# Patient Record
Sex: FEMALE | Race: WHITE | Employment: FULL TIME | URBAN - METROPOLITAN AREA
[De-identification: names, ages, dates, MRNs, and addresses within clinical notes are randomized per-mention and may not be internally consistent; named-entity substitution may affect disease eponyms.]

---

## 2021-11-23 ENCOUNTER — OFFICE VISIT (OUTPATIENT)
Dept: NEUROLOGY | Age: 51
End: 2021-11-23
Payer: COMMERCIAL

## 2021-11-23 VITALS
BODY MASS INDEX: 27.49 KG/M2 | WEIGHT: 161 LBS | TEMPERATURE: 97.9 F | RESPIRATION RATE: 16 BRPM | HEART RATE: 71 BPM | OXYGEN SATURATION: 96 % | HEIGHT: 64 IN | SYSTOLIC BLOOD PRESSURE: 102 MMHG | DIASTOLIC BLOOD PRESSURE: 63 MMHG

## 2021-11-23 DIAGNOSIS — R20.0 NUMBNESS AND TINGLING IN LEFT ARM: ICD-10-CM

## 2021-11-23 DIAGNOSIS — R20.2 NUMBNESS AND TINGLING OF RIGHT ARM: ICD-10-CM

## 2021-11-23 DIAGNOSIS — G44.89 OTHER HEADACHE SYNDROME: ICD-10-CM

## 2021-11-23 DIAGNOSIS — R20.2 NUMBNESS AND TINGLING IN LEFT ARM: ICD-10-CM

## 2021-11-23 DIAGNOSIS — R20.0 NUMBNESS AND TINGLING OF RIGHT ARM: ICD-10-CM

## 2021-11-23 DIAGNOSIS — R42 DIZZY: Primary | ICD-10-CM

## 2021-11-23 PROCEDURE — 99244 OFF/OP CNSLTJ NEW/EST MOD 40: CPT | Performed by: PSYCHIATRY & NEUROLOGY

## 2021-11-23 RX ORDER — MODAFINIL 200 MG/1
TABLET ORAL
COMMUNITY
Start: 2021-09-28

## 2021-11-23 RX ORDER — GABAPENTIN 300 MG/1
300 CAPSULE ORAL
COMMUNITY

## 2021-11-23 RX ORDER — METFORMIN HYDROCHLORIDE 1000 MG/1
TABLET ORAL
COMMUNITY
Start: 2021-10-08

## 2021-11-23 RX ORDER — INSULIN GLARGINE 300 U/ML
30 INJECTION, SOLUTION SUBCUTANEOUS DAILY
COMMUNITY
Start: 2021-10-13

## 2021-11-23 RX ORDER — LANSOPRAZOLE 30 MG/1
CAPSULE, DELAYED RELEASE ORAL
COMMUNITY
Start: 2021-09-16

## 2021-11-23 RX ORDER — ROSUVASTATIN CALCIUM 5 MG/1
TABLET, COATED ORAL
COMMUNITY
Start: 2021-11-15

## 2021-11-23 RX ORDER — MONTELUKAST SODIUM 10 MG/1
TABLET ORAL
COMMUNITY
Start: 2021-10-21

## 2021-11-23 RX ORDER — HYDROCHLOROTHIAZIDE 25 MG/1
1 TABLET ORAL DAILY
COMMUNITY
Start: 2021-10-22

## 2021-11-23 RX ORDER — FLASH GLUCOSE SENSOR
KIT MISCELLANEOUS
COMMUNITY
Start: 2021-11-15

## 2021-11-23 RX ORDER — NEBIVOLOL 10 MG/1
TABLET ORAL
COMMUNITY
Start: 2021-10-21

## 2021-11-23 RX ORDER — LISINOPRIL 40 MG/1
TABLET ORAL
COMMUNITY

## 2021-11-23 RX ORDER — OMEPRAZOLE 20 MG/1
TABLET, DELAYED RELEASE ORAL
COMMUNITY

## 2021-11-23 NOTE — PROGRESS NOTES
[facility] and [address] at [facility] Neurology Clinics at [address] [facility] [address] [place], [address] [facility] [address] [facility], [facility] [address] [facility]  [phone] Office  [phone] Facsimile           Referring: Jama Khoury MD  [facility],  [address] [facility]    Chief Complaint   Patient presents with    New Patient    Numbness     both arms for the past yr but has progressed from every so often to frequent. will sometimes occur in legs but not as severe     51-year-old lady presents today for neurologic consultation regarding numbness in her upper extremities as well as headache and dizziness. She notes her symptoms have been ongoing for perhaps about 2 years but increasing in intensity over the last few months. She notes that she keeps a constant headache. She is having numbness in her bilateral upper extremities and has recently started to have what she calls dizziness. She notes that in terms of the numbness she is awakening with numbness in her bilateral upper extremities. She notes from the shoulders down to her fingers left greater than right are numb. All fingers are involved. She has tingling. Now is starting to have pain. No radicular pain. No neck pain. Just the upper extremities and all fingers are numb. She started to get weak in the hands. She is dropping things. Finding it difficult to open bottles or cans. She also gets the same sensation when she is driving holding the steering wheel. If she holds her phone for more than a minute she also have the same sensation. She is having headache this holoacranial.  No associated symptoms. Is constant. It never goes away. Dizziness is described as a feeling of imbalance but not lightheadedness. No spinning. Constant. No loss or alteration in consciousness. No hemibody weakness. No inciting factor. No imaging.   No changes in medicine. No fever. No chills. Past Medical History:   Diagnosis Date    Diabetes type 2, uncontrolled ([address]     High cholesterol     HTN (hypertension)        Past Surgical History:   Procedure Laterality Date    HX DILATION AND CURETTAGE  2007    HX HYSTERECTOMY  2012       Current Outpatient Medications   Medication Sig Dispense Refill    FreeStyle Carrie 14 Day Sensor kit USE AS DIRECTED TO TEST BLOOD SUGAR      hydroCHLOROthiazide (HYDRODIURIL) 25 mg tablet Take 1 Tablet by mouth daily.  Toujeo SoloStar U-300 Insulin 300 unit/mL (1.5 mL) inpn pen 30 Units by SubCUTAneous route daily.  lansoprazole (PREVACID) 30 mg capsule TAKE 1 CAPSULE BY MOUTH DAILY FOR ACID REFLUX      lisinopriL (PRINIVIL, ZESTRIL) 40 mg tablet lisinopril 40 mg tablet   Take 1 tablet every day by oral route.  metFORMIN (GLUCOPHAGE) 1,000 mg tablet TAKE 1 TABLET BY MOUTH TWICE A DAY (WITH FOOD) WITH A MEAL      modafiniL (PROVIGIL) 200 mg tablet TAKE 1 TABLET IN THE MORNING AND 1/2 TABLET IN THE AFTERNOON DAILY.  montelukast (SINGULAIR) 10 mg tablet TAKE 1 TABLET BY MOUTH AT NIGHT FOR ALLERGIES.  nebivoloL (BYSTOLIC) 10 mg tablet TAKE 1 TABLET BY MOUTH ONCE A DAY AT NIGHT FOR HEART RATE AND BLOOD PRESSURE.  omeprazole (PriLOSEC OTC) 20 mg tablet Prilosec OTC 20 mg tablet,delayed release   Take 1 tablet every day by oral route.  rosuvastatin (CRESTOR) 5 mg tablet TAKE ONE TABLET BY MOUTH EVERY DAY AFTER SUPPER FOR CHOLESTEROL FOR 30 DAYS      gabapentin (NEURONTIN) 300 mg capsule Take 300 mg by mouth nightly. No Known Allergies    Social History     Tobacco Use    Smoking status: Never Smoker    Smokeless tobacco: Never Used   Vaping Use    Vaping Use: Never used   Substance Use Topics    Alcohol use:  Yes     Alcohol/week: 1.0 standard drink     Types: 1 Cans of beer per week    Drug use: Not Currently       Family History   Problem Relation Age of Onset    Diabetes Mother  Headache Mother     Hypertension Mother     Other Father         aneurysm    Lung Cancer Father     Diabetes Father     Hypertension Father        Review of Systems  Pertinent positives and negatives as noted. Examination  Visit Vitals  /63 (BP 1 Location: Right arm, BP Patient Position: Sitting, BP Cuff Size: Adult)   Pulse 71   Temp 97.9 °F (36.6 °C)   Resp 16   Ht 5' 4\" (1.626 m)   Wt 73 kg (161 lb)   SpO2 96%   BMI 27.64 kg/m²     Neurologically, she is awake, alert, and oriented with normal speech and language. Her cognition is normal.    Intact cranial nerves 2-12. No nystagmus. Disk margins are flat bilaterally. She has normal bulk and tone. She has no abnormal movement. She has no pronation or drift. She generates full strength in the upper and lower extremities to direct confrontational testing. Reflexes are symmetrical in the upper and lower extremities bilaterally. No pathologic reflexes are elicited. Finger nose finger and rapid alternating movements are normal.  Steady gait. No Lhermitte sign    Impression/Plan  61-year-old lady with progressive symptoms headache extremity numbness/tingling as well as dizziness and differential diagnosis certainly includes demyelinating lesion versus space-occupying lesion versus vascular lesion versus other  MRI of the brain and if unremarkable we will proceed with MRI of the cervical spine  EMG of the upper extremities  Carotid Doppler  Follow-up after testing    Pearson Peabody, MD          This note was created using voice recognition software. Despite editing, there may be syntax errors. no

## 2021-11-23 NOTE — PROGRESS NOTES
Room 7    Chief Complaint   Patient presents with    New Patient    Numbness     both arms for the past yr but has progressed from every so often to frequent.   will sometimes occur in legs but not as severe

## 2021-12-13 ENCOUNTER — HOSPITAL ENCOUNTER (OUTPATIENT)
Dept: MRI IMAGING | Age: 51
Discharge: HOME | End: 2021-12-13
Payer: COMMERCIAL

## 2021-12-13 DIAGNOSIS — R20.2 NUMBNESS AND TINGLING OF RIGHT ARM: ICD-10-CM

## 2021-12-13 DIAGNOSIS — R42 DIZZY: ICD-10-CM

## 2021-12-13 DIAGNOSIS — R20.0 NUMBNESS AND TINGLING IN LEFT ARM: ICD-10-CM

## 2021-12-13 DIAGNOSIS — R20.0 NUMBNESS AND TINGLING OF RIGHT ARM: ICD-10-CM

## 2021-12-13 DIAGNOSIS — G44.89 OTHER HEADACHE SYNDROME: ICD-10-CM

## 2021-12-13 DIAGNOSIS — R20.2 NUMBNESS AND TINGLING IN LEFT ARM: ICD-10-CM

## 2021-12-13 LAB — CREAT BLD-MCNC: 0.9 MG/DL (ref 0.6–1.3)

## 2021-12-13 PROCEDURE — 70553 MRI BRAIN STEM W/O & W/DYE: CPT

## 2021-12-13 PROCEDURE — 82565 ASSAY OF CREATININE: CPT

## 2021-12-13 PROCEDURE — A9577 INJ MULTIHANCE: HCPCS | Performed by: PSYCHIATRY & NEUROLOGY

## 2021-12-13 PROCEDURE — 74011250636 HC RX REV CODE- 250/636: Performed by: PSYCHIATRY & NEUROLOGY

## 2021-12-13 RX ADMIN — GADOBENATE DIMEGLUMINE 15 ML: 529 INJECTION, SOLUTION INTRAVENOUS at 09:44

## 2022-01-05 ENCOUNTER — OFFICE VISIT (OUTPATIENT)
Dept: NEUROLOGY | Age: 52
End: 2022-01-05

## 2022-01-05 DIAGNOSIS — R20.2 NUMBNESS AND TINGLING OF RIGHT ARM: ICD-10-CM

## 2022-01-05 DIAGNOSIS — R42 DIZZY: Primary | ICD-10-CM

## 2022-01-05 DIAGNOSIS — G56.02 CARPAL TUNNEL SYNDROME OF LEFT WRIST: Primary | ICD-10-CM

## 2022-01-05 DIAGNOSIS — R20.0 NUMBNESS AND TINGLING OF RIGHT ARM: ICD-10-CM

## 2022-01-05 PROCEDURE — 95911 NRV CNDJ TEST 9-10 STUDIES: CPT | Performed by: PSYCHIATRY & NEUROLOGY

## 2022-01-05 PROCEDURE — 95886 MUSC TEST DONE W/N TEST COMP: CPT | Performed by: PSYCHIATRY & NEUROLOGY

## 2022-01-05 NOTE — PROCEDURES
EMG/ NCS Report  [facility] - INPATIENT  [address] [facility], [address]   Ph: [phone]   FAX: [phone]/ [phone]    Test Date:  2022    Patient: Lakshmi Machado : 1970 Physician: Raul Girard MD   ID#: [id number] SEX: Female Ref. Phys: Miley Ashley M.D. Patient History / Exam:  Patient complaining of numbness and tingling of the upper extremity with weakness. Exam is non-focal. Assess for neuropathy vs cervical radiculopathy. EMG & NCV Findings:  Sensory and motor nerve conductions studies (as indicated in the tables) were within reference of normal. There is however a significant latency difference between the left median and ulnar sensory responses. Disposable concentric needle EMG (as indicated in the table) showed no evidence of electrical instability. Impressions: This study is mildly abnormal.  There is electrodiagnostic evidence of.a very early left distal median sensory neuropathy at the wrist as seen in carpal tunnel syndrome. There is no evidence of a generalized neuropathy, myopathy or significant cervical radiculopathy at this time. Recommend night time wrist splint. Thank you for the consult.     Raul Girard MD    Nerve Conduction Studies  Anti Sensory Summary Table     Stim Site NR Peak (ms) Norm Peak (ms) P-T Amp (µV) Norm P-T Amp Site1 Site2 Dist (cm)   Left Median Anti Sensory (2nd Digit)  32.1°C   Wrist    3.9 <4 19.9 >13 Wrist 2nd Digit 14.0   Right Median Anti Sensory (2nd Digit)  32.2°C   Wrist    3.4 <4 29.1 >13 Wrist 2nd Digit 14.0   Left Radial Anti Sensory (Base 1st Digit)  32.6°C   Wrist    1.9 <2.8 36.8 >11 Wrist Base 1st Digit 10.0   Right Radial Anti Sensory (Base 1st Digit)  32.3°C   Wrist    1.9 <2.8 35.9 >11 Wrist Base 1st Digit 10.0   Left Ulnar Anti Sensory (5th Digit)  32.5°C   Wrist    3.0 <4.0 32.3 >9 Wrist 5th Digit 14.0   Right Ulnar Anti Sensory (5th Digit)  32.3°C   Wrist 3. 1 <4.0 33.1 >9 Wrist 5th Digit 14.0     Motor Summary Table     Stim Site NR Onset (ms) Norm Onset (ms) O-P Amp (mV) Norm O-P Amp Amp (Prev) (%) Site1 Site2 Dist (cm) Bryson (m/s) Norm Bryson (m/s)   Left Median Motor (Abd Poll Brev)  32.5°C   Wrist    4.4 <4.5 7.3 >4.1 100.0 Wrist Abd Poll Brev 8.0     Elbow    8.0  6.0  82.2 Elbow Wrist 18.5 51 >49   Right Median Motor (Abd Poll Brev)  32.3°C   Wrist    3.9 <4.5 7.9 >4.1 100.0 Wrist Abd Poll Brev 8.0     Elbow    7.5  7.7  97.5 Elbow Wrist 19.5 54 >49   Left Ulnar Motor (Abd Dig Minimi)  32.5°C   Wrist    2.3 <3.1 9.2 >7.0 100.0 Wrist Abd Dig Minimi 8.0  >50   B Elbow    5.6  7.9  85.9 B Elbow Wrist 19.0 58 >50   Right Ulnar Motor (Abd Dig Minimi)  32.2°C   Wrist    2.3 <3.1 7.6 >7.0 100.0 Wrist Abd Dig Minimi 8.0  >50   B Elbow    5.4  7.9  103.9 B Elbow Wrist 18.0 58 >50   A Elbow    6.9  8.0  101.3 A Elbow B Elbow 10.0 67 >50       EMG     Side Muscle Nerve Root Ins Act Fibs Psw Recrt Duration Amp Poly Comment   Left Deltoid Axillary C5-6 Nml Nml Nml Nml Nml Nml Nml    Left Triceps Radial C6-7-8 Nml Nml Nml Nml Nml Nml Nml    Left Biceps Musculocut C5-6 Nml Nml Nml Nml Nml Nml Nml    Left 1stDorInt Ulnar C8-T1 Nml Nml Nml Nml Nml Nml Nml    Left Abd Poll Brev Median C8-T1 Nml Nml Nml Nml Nml Nml Nml    Left Upper Cerv Parasp Rami C3,4 Nml Nml Nml Nml Nml Nml Nml    Left Mid Cerv Parasp Rami C5,6 Nml Nml Nml Nml Nml Nml Nml    Left Lower Cerv Parasp Rami C7,T1 Nml Nml Nml Nml Nml Nml Nml    Right Deltoid Axillary C5-6 Nml Nml Nml Nml Nml Nml Nml    Right Triceps Radial C6-7-8 Nml Nml Nml Nml Nml Nml Nml    Right Biceps Musculocut C5-6 Nml Nml Nml Nml Nml Nml Nml    Right PronatorTeres Median C6-7 Nml Nml Nml Nml Nml Nml Nml    Right 1stDorInt Ulnar C8-T1 Nml Nml Nml Nml Nml Nml Nml    Right Lower Cerv Parasp Rami C7,T1 Nml Nml Nml Nml Nml Nml Nml    Right Mid Cerv Parasp Rami C5,6 Nml Nml Nml Nml Nml Nml Nml    Right Upper Cerv Parasp Rami C3,4 Nml Nml Nml Nml Nml Nml Nml      Waveforms:

## 2022-01-06 NOTE — PROCEDURES
EEG:      Date:  01/05/22    Requesting Physician:  Soledad Pimentel MD     An EEG is requested in this 53-year-old lady to evaluate for epileptiform abnormality. Medications:  Medications are said to include Neurontin, Crestor, Prilosec, Bystolic, Singulair, Metformin, Lisinopril, Prevacid. This tracing is obtained during the awake state. During wakefulness there are intermittent runs of posteriorly dominant and symmetric low to medium amplitude 10 cycle per second activities, which attenuate with eye opening. Lower voltage, faster frequency activities are seen symmetrically over the anterior head regions. Hyperventilation is not performed. Intermittent photic stimulation induces symmetric posterior driving responses. Sleep is not attained. Interpretation:   This EEG recorded during the awake state is normal.

## 2022-02-03 ENCOUNTER — OFFICE VISIT (OUTPATIENT)
Dept: NEUROLOGY | Age: 52
End: 2022-02-03
Payer: COMMERCIAL

## 2022-02-03 VITALS
DIASTOLIC BLOOD PRESSURE: 84 MMHG | SYSTOLIC BLOOD PRESSURE: 118 MMHG | OXYGEN SATURATION: 97 % | RESPIRATION RATE: 19 BRPM | HEART RATE: 76 BPM

## 2022-02-03 DIAGNOSIS — R20.0 NUMBNESS AND TINGLING OF RIGHT ARM: Primary | ICD-10-CM

## 2022-02-03 DIAGNOSIS — G56.02 CARPAL TUNNEL SYNDROME OF LEFT WRIST: ICD-10-CM

## 2022-02-03 DIAGNOSIS — R20.2 NUMBNESS AND TINGLING OF RIGHT ARM: Primary | ICD-10-CM

## 2022-02-03 DIAGNOSIS — R20.2 NUMBNESS AND TINGLING IN LEFT ARM: ICD-10-CM

## 2022-02-03 DIAGNOSIS — R20.0 NUMBNESS AND TINGLING IN LEFT ARM: ICD-10-CM

## 2022-02-03 PROCEDURE — 99214 OFFICE O/P EST MOD 30 MIN: CPT | Performed by: PSYCHIATRY & NEUROLOGY

## 2022-02-03 NOTE — PROGRESS NOTES
[address] [facility] Neurology Clinics and [address] at [facility] Neurology Clinics at [address] [facility], [address] [place] [address] [address] [facility], [address] [place] [address]   [phone]              Chief Complaint   Patient presents with    Results     Current Outpatient Medications   Medication Sig Dispense Refill    FreeStyle Carrie 14 Day Sensor kit USE AS DIRECTED TO TEST BLOOD SUGAR      hydroCHLOROthiazide (HYDRODIURIL) 25 mg tablet Take 1 Tablet by mouth daily.  Toujeo SoloStar U-300 Insulin 300 unit/mL (1.5 mL) inpn pen 30 Units by SubCUTAneous route daily.  lansoprazole (PREVACID) 30 mg capsule TAKE 1 CAPSULE BY MOUTH DAILY FOR ACID REFLUX      lisinopriL (PRINIVIL, ZESTRIL) 40 mg tablet lisinopril 40 mg tablet   Take 1 tablet every day by oral route.  metFORMIN (GLUCOPHAGE) 1,000 mg tablet TAKE 1 TABLET BY MOUTH TWICE A DAY (WITH FOOD) WITH A MEAL      montelukast (SINGULAIR) 10 mg tablet TAKE 1 TABLET BY MOUTH AT NIGHT FOR ALLERGIES.  nebivoloL (BYSTOLIC) 10 mg tablet TAKE 1 TABLET BY MOUTH ONCE A DAY AT NIGHT FOR HEART RATE AND BLOOD PRESSURE.  omeprazole (PriLOSEC OTC) 20 mg tablet Prilosec OTC 20 mg tablet,delayed release   Take 1 tablet every day by oral route.  rosuvastatin (CRESTOR) 5 mg tablet TAKE ONE TABLET BY MOUTH EVERY DAY AFTER SUPPER FOR CHOLESTEROL FOR 30 DAYS      gabapentin (NEURONTIN) 300 mg capsule Take 300 mg by mouth nightly.  modafiniL (PROVIGIL) 200 mg tablet TAKE 1 TABLET IN THE MORNING AND 1/2 TABLET IN THE AFTERNOON DAILY. (Patient not taking: Reported on 2/3/2022)        No Known Allergies  Social History     Tobacco Use    Smoking status: Never Smoker    Smokeless tobacco: Never Used   Vaping Use    Vaping Use: Never used   Substance Use Topics    Alcohol use:  Yes     Alcohol/week: 1.0 standard drink     Types: 1 Cans of beer per week    Drug use: Not Currently 53-year-old lady returns today for follow-up. I saw her as an initial consultation for numbness in her upper extremities as well as complaints of dizziness. We sent her for several tests  MRI of the brain unremarkable except for empty sella. Carotid Doppler unremarkable  EEG normal  EMG done by one of our neuromuscular experts demonstrated carpal tunnel syndrome left  Today she reports she still has tingling and numbness in the bilateral upper extremities encompassing the entire upper extremity. Having some sleep difficulty as well. Has not seen a sleep doctor since they told her that she no longer had sleep apnea but she is excessively somnolent during the day and actually caught her self dozing off while driving    Examination  Visit Vitals  /84 (BP 1 Location: Left arm, BP Patient Position: Sitting, BP Cuff Size: Adult)   Pulse 76   Resp 19   SpO2 97%   Very pleasant lady. Awake alert and oriented. Speech and language normal.  Cognition normal.  No ataxia    Impression/Plan  Numbness and tingling of the bilateral upper extremities with normal evaluation thus far. The only other anatomic explanation/localization would be cervical spine  MRI cervical spine    Carpal tunnel syndrome left  We will refer to hand therapy close to [facility] where she lives    Excessive daytime somnolence  Encouraged her to follow-up with sleep    We will have a virtual follow-up with her after the MRI    Shravan Altamirano MD        This note was created using voice recognition software. Despite editing, there may be syntax errors.

## 2022-02-24 ENCOUNTER — TELEPHONE (OUTPATIENT)
Dept: NEUROLOGY | Age: 52
End: 2022-02-24

## 2022-02-24 NOTE — TELEPHONE ENCOUNTER
Patients test for tomorrow was denied by the insurance call [phone]  To provide more information  Case # [id number]

## 2022-04-28 ENCOUNTER — VIRTUAL VISIT (OUTPATIENT)
Dept: NEUROLOGY | Age: 52
End: 2022-04-28
Payer: COMMERCIAL

## 2022-04-28 DIAGNOSIS — R20.2 NUMBNESS AND TINGLING IN LEFT ARM: ICD-10-CM

## 2022-04-28 DIAGNOSIS — G56.02 CARPAL TUNNEL SYNDROME OF LEFT WRIST: Primary | ICD-10-CM

## 2022-04-28 DIAGNOSIS — R20.0 NUMBNESS AND TINGLING IN LEFT ARM: ICD-10-CM

## 2022-04-28 DIAGNOSIS — M62.838 CERVICAL PARASPINAL MUSCLE SPASM: ICD-10-CM

## 2022-04-28 DIAGNOSIS — R20.2 NUMBNESS AND TINGLING OF RIGHT ARM: ICD-10-CM

## 2022-04-28 DIAGNOSIS — G44.89 OTHER HEADACHE SYNDROME: ICD-10-CM

## 2022-04-28 DIAGNOSIS — R20.0 NUMBNESS AND TINGLING OF RIGHT ARM: ICD-10-CM

## 2022-04-28 PROCEDURE — 99214 OFFICE O/P EST MOD 30 MIN: CPT | Performed by: NURSE PRACTITIONER

## 2022-04-28 RX ORDER — AMITRIPTYLINE HYDROCHLORIDE 25 MG/1
25 TABLET, FILM COATED ORAL
Qty: 30 TABLET | Refills: 2 | Status: SHIPPED | OUTPATIENT
Start: 2022-04-28

## 2022-04-28 NOTE — PROGRESS NOTES
Rita Vargas is a 50 y.o. female who was seen by synchronous (real-time) audio-video technology on 4/28/2022 for Results        Assessment & Plan:   Diagnoses and all orders for this visit:    1. Carpal tunnel syndrome of left wrist  -     REFERRAL TO PHYSICAL THERAPY    2. Numbness and tingling in left arm  -     REFERRAL TO PHYSICAL THERAPY    3. Numbness and tingling of right arm  -     REFERRAL TO PHYSICAL THERAPY    4. Other headache syndrome  -     REFERRAL TO PHYSICAL THERAPY  -     amitriptyline (ELAVIL) 25 mg tablet; Take 1 Tablet by mouth nightly. 5. Cervical paraspinal muscle spasm  -     REFERRAL TO PHYSICAL THERAPY  -     amitriptyline (ELAVIL) 25 mg tablet; Take 1 Tablet by mouth nightly. By description, she has muscle contraction headache which may also be contributing to some of her other complaints as well. Work up has been largely normal as has her physical exams, even today's which is limited in terms of strength and sensory testing. Recommended we pursue PT for the neck/shoulder spasm and CTS. Trial of Elavil at bedtime for prevention. If no improvement, then will pursue cervical spinal imaging. Follow up in three months. Subjective: Follow up for results. This nice lady was initially seen by Dr. Uday Freitas with bilateral arm tingling and weakness that started off intermittently and progressed to a more persistent pattern, left greater than right. She has had some mild dizziness which is more of an unsteadiness. She has had intermittent holoacranial headaches as well. While she dose not have any neck pain per se, she does have increased muscle spasm in the neck and shoulders. For further evaluation, she was sent for MRI of the brain which was normal, and a cervical spine MRI which was denied by insurance. EMG demonstrated a mild CTS of the left hand.  Carotid doppler and EEG were normal. She was referred for occupational therapy for the CTS but she has had difficulty finding one in her area.     Prior to Admission medications    Medication Sig Start Date End Date Taking? Authorizing Provider   amitriptyline (ELAVIL) 25 mg tablet Take 1 Tablet by mouth nightly. 4/28/22  Yes Juanita Sellers NP   FreeStyle Carrie 14 Day Sensor kit USE AS DIRECTED TO TEST BLOOD SUGAR 11/15/21  Yes Provider, Historical   hydroCHLOROthiazide (HYDRODIURIL) 25 mg tablet Take 1 Tablet by mouth daily. 10/22/21  Yes Provider, Historical   Toujeo SoloStar U-300 Insulin 300 unit/mL (1.5 mL) inpn pen 30 Units by SubCUTAneous route daily. 10/13/21  Yes Provider, Historical   lansoprazole (PREVACID) 30 mg capsule TAKE 1 CAPSULE BY MOUTH DAILY FOR ACID REFLUX 9/16/21  Yes Provider, Historical   lisinopriL (PRINIVIL, ZESTRIL) 40 mg tablet lisinopril 40 mg tablet   Take 1 tablet every day by oral route. Yes Provider, Historical   metFORMIN (GLUCOPHAGE) 1,000 mg tablet TAKE 1 TABLET BY MOUTH TWICE A DAY (WITH FOOD) WITH A MEAL 10/8/21  Yes Provider, Historical   modafiniL (PROVIGIL) 200 mg tablet TAKE 1 TABLET IN THE MORNING AND 1/2 TABLET IN THE AFTERNOON DAILY. 9/28/21  Yes Provider, Historical   montelukast (SINGULAIR) 10 mg tablet TAKE 1 TABLET BY MOUTH AT NIGHT FOR ALLERGIES. 10/21/21  Yes Provider, Historical   nebivoloL (BYSTOLIC) 10 mg tablet TAKE 1 TABLET BY MOUTH ONCE A DAY AT NIGHT FOR HEART RATE AND BLOOD PRESSURE. 10/21/21  Yes Provider, Historical   omeprazole (PriLOSEC OTC) 20 mg tablet Prilosec OTC 20 mg tablet,delayed release   Take 1 tablet every day by oral route. Yes Provider, Historical   rosuvastatin (CRESTOR) 5 mg tablet TAKE ONE TABLET BY MOUTH EVERY DAY AFTER SUPPER FOR CHOLESTEROL FOR 30 DAYS 11/15/21  Yes Provider, Historical   gabapentin (NEURONTIN) 300 mg capsule Take 300 mg by mouth nightly. Yes Provider, Historical         ROS    Objective:   No flowsheet data found.      [INSTRUCTIONS:  \"[x]\" Indicates a positive item  \"[]\" Indicates a negative item  -- DELETE ALL ITEMS NOT EXAMINED]    Constitutional: [x] Appears well-developed and well-nourished [x] No apparent distress      [] Abnormal -     Mental status: [x] Alert and awake  [x] Oriented to person/place/time [x] Able to follow commands    [] Abnormal -     Eyes:   EOM    [x]  Normal    [] Abnormal -   Sclera  [x]  Normal    [] Abnormal -          Discharge [x]  None visible   [] Abnormal -     HENT: [x] Normocephalic, atraumatic  [] Abnormal -   [x] Mouth/Throat: Mucous membranes are moist    External Ears [x] Normal  [] Abnormal -    Neck: [x] No visualized mass [] Abnormal -     Pulmonary/Chest: [x] Respiratory effort normal   [x] No visualized signs of difficulty breathing or respiratory distress        [] Abnormal -      Musculoskeletal:   [x] Normal gait with no signs of ataxia         [x] Normal range of motion of neck        [] Abnormal -     Neurological:        [x] No Facial Asymmetry (Cranial nerve 7 motor function) (limited exam due to video visit)          [x] No gaze palsy        [] Abnormal -        Gait and station were normal. A&Ox3. Skin:        [x] No significant exanthematous lesions or discoloration noted on facial skin         [] Abnormal -            Psychiatric:       [x] Normal Affect [] Abnormal -        [x] No Hallucinations    Other pertinent observable physical exam findings:-        We discussed the expected course, resolution and complications of the diagnosis(es) in detail. Medication risks, benefits, costs, interactions, and alternatives were discussed as indicated. I advised her to contact the office if her condition worsens, changes or fails to improve as anticipated. She expressed understanding with the diagnosis(es) and plan. Alecia Gannia, was evaluated through a synchronous (real-time) audio-video encounter. The patient (or guardian if applicable) is aware that this is a billable service, which includes applicable co-pays. Verbal consent to proceed has been obtained.  The visit was conducted pursuant to the emergency declaration under the [facility], [address] [place] [address] authority and the [employer] General Act. Patient identification was verified, and a caregiver was present when appropriate. The patient was located at home in a state where the provider was licensed to provide care.       Catalino Domingo NP

## 2022-04-28 NOTE — PATIENT INSTRUCTIONS
Carpal Tunnel Syndrome: Care Instructions  Your Care Instructions    Carpal tunnel syndrome is a nerve problem. It can cause tingling, numbness, weakness, or pain in the fingers, thumb, and hand. The median nerve and several tough tissues called tendons run through a space in the wrist called the carpal tunnel. The repeated hand motions used in work and some hobbies and sports can put pressure on the nerve. Pregnancy and several conditions, including diabetes, arthritis, and an underactive thyroid, also can cause carpal tunnel syndrome. You may be able to limit an activity or do it differently to reduce your symptoms. You also can take other steps to feel better. If your symptoms are mild, 1 to 2 weeks of home treatment are likely to ease your pain. Surgery is needed only if other treatments do not work. Follow-up care is a key part of your treatment and safety. Be sure to make and go to all appointments, and call your doctor if you are having problems. It's also a good idea to know your test results and keep a list of the medicines you take. How can you care for yourself at home? · If possible, stop or reduce the activity that causes your symptoms. If you cannot stop the activity, take frequent breaks to rest and stretch or change hand positions to do a task. Try switching hands, such as when using a computer mouse. · Try to avoid bending or twisting your wrists. · Ask your doctor if you can take an over-the-counter pain medicine, such as acetaminophen (Tylenol), ibuprofen (Advil, Motrin), or naproxen (Aleve). Be safe with medicines. Read and follow all instructions on the label. · If your doctor prescribes corticosteroid medicine to help reduce pain and swelling, take it exactly as prescribed. Call your doctor if you think you are having a problem with your medicine. · Put ice or a cold pack on your wrist for 10 to 20 minutes at a time to ease pain. Put a thin cloth between the ice and your skin.   · If your doctor or your physical or occupational therapist tells you to wear a wrist splint, wear it as directed to keep your wrist in a neutral position. This also eases pressure on your median nerve. · Ask your doctor whether you should have physical or occupational therapy to learn how to do tasks differently. · Try a yoga class to stretch your muscles and build strength in your hands and wrists. Yoga has been shown to ease carpal tunnel symptoms. To prevent carpal tunnel  · When working at a computer, keep your hands and wrists in line with your forearms. Hold your elbows close to your sides. Take a break every 10 to 15 minutes. · Try these exercises:  ¨ Warm up: Rotate your wrist up, down, and from side to side. Repeat this 4 times. Stretch your fingers far apart, relax them, then stretch them again. Repeat 4 times. Stretch your thumb by pulling it back gently, holding it, and then releasing it. Repeat 4 times. ¨ Prayer stretch: Start with your palms together in front of your chest just below your chin. Slowly lower your hands toward your waistline while keeping your hands close to your stomach and your palms together until you feel a mild to moderate stretch under your forearms. Hold for 10 to 20 seconds. Repeat 4 times. ¨ Wrist flexor stretch: Hold your arm in front of you with your palm up. Bend your wrist, pointing your hand toward the floor. With your other hand, gently bend your wrist further until you feel a mild to moderate stretch in your forearm. Hold for 10 to 20 seconds. Repeat 4 times. ¨ Wrist extensor stretch: Repeat the steps for the wrist flexor stretch, but begin with your extended hand palm down. · Squeeze a rubber exercise ball several times a day to keep your hands and fingers strong. · Avoid holding objects (such as a book) in one position for a long time. When possible, use your whole hand to grasp an object.  Using just the thumb and index finger can put stress on the wrist.  · Do not smoke. It can make this condition worse by reducing blood flow to the median nerve. If you need help quitting, talk to your doctor about stop-smoking programs and medicines. These can increase your chances of quitting for good. When should you call for help? Watch closely for changes in your health, and be sure to contact your doctor if:  ? · Your pain or other problems do not get better with home care. ? · You want more information about physical or occupational therapy. ? · You have side effects of your corticosteroid medicine, such as:  ¨ Weight gain. ¨ Mood changes. ¨ Trouble sleeping. ¨ Bruising easily. ? · You have any other problems with your medicine. Where can you learn more? Go to http://www.gray.com/. Enter R432 in the search box to learn more about \"Carpal Tunnel Syndrome: Care Instructions. \"  Current as of: March 21, 2017  Content Version: 11.5  © 9832-0603 [employer]. Care instructions adapted under license by [employer] (which disclaims liability or warranty for this information). If you have questions about a medical condition or this instruction, always ask your healthcare professional. [place] [address] any warranty or liability for your use of this information. Carpal Tunnel Syndrome: Exercises  Introduction  Here are some examples of exercises for you to try. The exercises may be suggested for a condition or for rehabilitation. Start each exercise slowly. Ease off the exercises if you start to have pain. You will be told when to start these exercises and which ones will work best for you. Warm-up stretches  When you no longer have pain or numbness, you can do exercises to help prevent carpal tunnel syndrome from coming back. Do not do any stretch or movement that is uncomfortable or painful. 1. Rotate your wrist up, down, and from side to side. Repeat 4 times. 2. Stretch your fingers far apart.  Relax them, and then stretch them again. Repeat 4 times. 3. Stretch your thumb by pulling it back gently, holding it, and then releasing it. Repeat 4 times. How to do the exercises  Prayer stretch    1. Start with your palms together in front of your chest just below your chin. 2. Slowly lower your hands toward your waistline, keeping your hands close to your stomach and your palms together until you feel a mild to moderate stretch under your forearms. 3. Hold for at least 15 to 30 seconds. Repeat 2 to 4 times. Wrist flexor stretch    1. Extend your arm in front of you with your palm up. 2. Bend your wrist, pointing your hand toward the floor. 3. With your other hand, gently bend your wrist farther until you feel a mild to moderate stretch in your forearm. 4. Hold for at least 15 to 30 seconds. Repeat 2 to 4 times. Wrist extensor stretch    1. Repeat steps 1 through 4 of the stretch above, but begin with your extended hand palm down. Follow-up care is a key part of your treatment and safety. Be sure to make and go to all appointments, and call your doctor if you are having problems. It's also a good idea to know your test results and keep a list of the medicines you take. Where can you learn more? Go to http://www.gray.com/  Enter R672 in the search box to learn more about \"Carpal Tunnel Syndrome: Exercises. \"  Current as of: July 1, 2021               Content Version: 13.2  © 2006-2022 [employer]. Care instructions adapted under license by [employer] (which disclaims liability or warranty for this information). If you have questions about a medical condition or this instruction, always ask your healthcare professional. [place] [address] any warranty or liability for your use of this information.